# Patient Record
Sex: MALE | URBAN - METROPOLITAN AREA
[De-identification: names, ages, dates, MRNs, and addresses within clinical notes are randomized per-mention and may not be internally consistent; named-entity substitution may affect disease eponyms.]

---

## 2018-02-08 ENCOUNTER — OFFICE VISIT (OUTPATIENT)
Dept: ORTHOPEDICS | Facility: CLINIC | Age: 23
End: 2018-02-08
Payer: COMMERCIAL

## 2018-02-08 ENCOUNTER — RADIANT APPOINTMENT (OUTPATIENT)
Dept: GENERAL RADIOLOGY | Facility: CLINIC | Age: 23
End: 2018-02-08
Attending: FAMILY MEDICINE
Payer: COMMERCIAL

## 2018-02-08 VITALS
HEART RATE: 69 BPM | BODY MASS INDEX: 26.07 KG/M2 | DIASTOLIC BLOOD PRESSURE: 84 MMHG | HEIGHT: 68 IN | SYSTOLIC BLOOD PRESSURE: 138 MMHG | WEIGHT: 172 LBS

## 2018-02-08 DIAGNOSIS — M54.50 ACUTE MIDLINE LOW BACK PAIN WITHOUT SCIATICA: Primary | ICD-10-CM

## 2018-02-08 DIAGNOSIS — M54.50 ACUTE MIDLINE LOW BACK PAIN WITHOUT SCIATICA: ICD-10-CM

## 2018-02-08 DIAGNOSIS — M54.6 PAIN IN THORACIC SPINE: ICD-10-CM

## 2018-02-08 NOTE — LETTER
2/8/2018       RE: Toan Chang  [address]    [facility] [address]     Dear Colleague,    Thank you for referring your patient, Toan Chang, to the [facility] SPORTS AND ORTHOPAEDIC WALK IN CLINIC at [facility]. Please see a copy of my visit note below.         NEW PATIENT INTAKE QUESTIONNAIRE  SPORTS & ORTHOPEDIC WALK-IN 2/8/2018    Primary Care Physician:     Where are the majority of your medical records? 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  Hx of LBP. Believes has had imaging done. Stretching    Reason for Visit:    What part of your body is injured / painful?   low back    What caused the injury /pain? Fall    How long ago did your injury occur or pain begin? several days ago    What are your most bothersome symptoms? Pain    How would you characterize your symptom? sharp    What makes your symptoms better? Ice and using pillow    What makes your symptoms worse? Movement, flexion    Have you been previously seen for this problem? No    Medical History:    Medical History:     Have you had surgery on this body part before? No    Medications: None    Allergies: Yes: Asp (catepillar)    Family History of Medical Problems: Prostate cancer-Father    Previous Surgeries: Splint in right foot    Social History:    Occupation: Law Student at [employer]    Handedness: Right    Exercise: Run, Strength training    Review of Systems:    Have you recently had a a fever, chills, weight loss? No    Do you have any vision problems? No    Do you have any chest pain or edema? No    Do you have any shortness of breath or wheezing?  No    Do you have stomach problems? No    Do you have any numbness or focal weakness? No    Do you have diabetes? No    Do you have problems with bleeding or clotting? No    Do you have an rashes or other skin lesions?  No    Communication:    How did you hear about us? The  informed me about the Walk-In Clinic    Who else should know about this visit? None           CHIEF COMPLAINT:  Consult (Low back)       HISTORY OF PRESENT ILLNESS  Mr. Chang is a pleasant 22 year old year old male who presents to clinic today with pain of his mid to low back.  Toan explains that he suffered an acute injury three days ago.  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  She lost balance above him, fell backward.  Her head swung into his thoracolumbar region striking him.  Immediately painful, causing both to fall.      Since this time, he has been using ice, resting from performances, and using a pillow in lumbar region to sleep.  He has noted mild improvement with treatment modalities, but this has not improved greatly. Exacerbated with movement, flexion. Denies radicular pain into legs or buttocks.  No numbness or tingling.     Additional history: as documented    MEDICAL HISTORY  There is no problem list on file for this patient.      No current outpatient prescriptions on file.       Allergies   Allergen Reactions     Insect Extract      Asp- a [place] catepillar     Social:   Law student at  of   Right handed  Exercise: Runs, strength training    No family history on file.    Additional medical/Social/Surgical histories reviewed in [employer] and updated as appropriate.     REVIEW OF SYSTEMS (2/12/2018)  CONSTITUTIONAL: Denies fever and weight loss  EYES: Denies acute vision changes  ENT: Denies hearing changes or difficulty swallowing  CARDIAC: Denies chest pain or edema  RESPIRATORY: Denies dyspnea, cough or wheeze  GASTROINTESTINAL: Denies abdominal pain, nausea, vomiting  MUSCULOSKELETAL: See HPI  SKIN: Denies any recent rash or lesion  NEUROLOGICAL: Denies numbness or focal weakness  PSYCHIATRIC: No history of psychiatric symptoms or problems  ENDOCRINE: Denies current diagnosis of  "diabetes  HEMATOLOGY: Denies episodes of easy bleeding      PHYSICAL EXAM  /84  Pulse 69  Ht 1.715 m (5' 7.5\")  Wt 78 kg (172 lb)  BMI 26.54 kg/m2      General Appearance: Well appearing, alert, in no acute distress, well-hydrated, and well nourished  Cardiovascular: no signs of upper or lower extremity edema  Respiratory: no respiratory distress, no audible wheezing, no labored breathing, symmetric thoracic excursion  Psychiatric: mood and affect are appropriate, patient is oriented to time, place and person  General Appearance: Well appearing, alert, in no acute distress, well-hydrated, and well nourished  Skin: No rashes, lesions, or ecchymosis present  Cardiovascular: pedal pulses and radial pulses normal, no signs of upper or lower extremity edema  Respiratory: no respiratory distress, no audible wheezing, no labored breathing, symmetric thoracic excursion  Psychiatric: mood and affect are appropriate, patient is oriented to time, place and person  Extremities: no peripheral edema noted in the upper or lower extremities  Musculoskeletal: Thoracolumbar region  Inspection: Normal inspection.  No erythema, ecchymosis or gross deformity of spinous processes or low back  Palpation: Tenderness to palpation of T12, L1 spinous processes.  Additional tenderness of soft tissue in this region.    Range of Motion: Normal range of motion of lumbar spine in flexion, extension and sidebending.  Pain with deep flexion past 60 degrees.  Strength: Full 5/5 strength of lower extremities in hip flexion, extension, knee flexion, knee extension, ankle dorsiflexion, plantarflexion and eversion.  Neuro: No sensory or motor deficit, grossly normal coordination, normal muscle tone  Skin: no ecchymosis, erythema, warmth, or induration, no obvious rash  Psych: interactive, appropriate, normal mood and affect      IMAGING :XR thoracolumbar 2v Final results and radiologist's interpretation, available in the [facility] health record. " Images were reviewed with the patient/family members in the office today. My personal interpretation of the performed imaging is no acute osseous abnormality of spinous processes or pars.       ASSESSMENT & PLAN  Mr. Chang is a 22 year old year old male who presents to clinic today with acute onset of low back pain after acute trauma of another performer's head striking his back three days ago.    Diagnosis:   Contusion of low back    -Continue ice to low back  -Activity modification; rest from performing this week  -Ibuprofen  -Home exercise program  -Follow up 2 weeks if persisting    It was a pleasure seeing Toan today.    Again, thank you for allowing me to participate in the care of your patient.      Sincerely,    Patel Bowie, DO

## 2018-02-08 NOTE — MR AVS SNAPSHOT
After Visit Summary   2018    Toan Chang    MRN: [id number]           Patient Information     Date Of Birth          1995        Visit Information        Provider Department      2018 6:10 PM Patel Bowie DO M [facility] Sports and Orthopaedic Walk In Clinic        Today's Diagnoses     Acute midline low back pain without sciatica    -  1    Pain in thoracic spine           Follow-ups after your visit        Your next 10 appointments already scheduled     2018  3:20 PM CST   (Arrive by 3:05 PM)   New Patient Visit with DAVID Almanza [facility] ([facility])    [address] [place] [address]  4th [facility] [address] [phone]              Who to contact     Please call your clinic at [phone] to:    Ask questions about your health    Make or cancel appointments    Discuss your medicines    Learn about your test results    Speak to your doctor            Additional Information About Your Visit        MyChart Information     [employer] is an electronic gateway that provides easy, online access to your medical records. With iProcure, you can request a clinic appointment, read your test results, renew a prescription or communicate with your care team.     To sign up for [employer] visit the website at [email or web address]   You will be asked to enter the access code listed below, as well as some personal information. Please follow the directions to create your username and password.     Your access code is: [email or web address]  Expires: 5/10/2018  6:30 AM     Your access code will  in 90 days. If you need help or a new code, please contact your [facility] or call [phone] for assistance.        Care EveryWhere ID     This is your Care EveryWhere ID. This could be used by other organizations to access your [facility] medical records  [id number]        Your Vitals Were     Pulse  "Height BMI (Body Mass Index)             69 1.715 m (5' 7.5\") 26.54 kg/m2          Blood Pressure from Last 3 Encounters:   02/08/18 138/84    Weight from Last 3 Encounters:   02/08/18 78 kg (172 lb)               Primary Care Provider Office Phone # Fax #    DAVID Almanza [phone]       [address] [place] [address]  [facility] [address]        Equal Access to Services     DARCY GHOSH : Hadii aad ku hadasho Soomaali, waaxda luqadaha, qaybta kaalmada adeegyada, waxay idiin hayaan adeeg kharash larui . So [facility] [phone].    ATENCIÓN: Si habla español, tiene a campos disposición servicios gratuitos de asistencia lingüística. [facility] [phone].    We comply with applicable federal civil rights laws and [place] laws. We do not discriminate on the basis of race, color, national origin, age, disability, sex, sexual orientation, or gender identity.            Thank you!     Thank you for choosing [facility] SPORTS AND ORTHOPAEDIC WALK IN CLINIC  for your care. Our goal is always to provide you with excellent care. Hearing back from our patients is one way we can continue to improve our services. Please take a few minutes to complete the written survey that you may receive in the mail after your visit with us. Thank you!             Your Updated Medication List - Protect others around you: Learn how to safely use, store and throw away your medicines at www.disposemymeds.org.      Notice  As of 2/8/2018 11:59 PM    You have not been prescribed any medications.      "

## 2018-02-09 NOTE — PROGRESS NOTES
NEW PATIENT INTAKE QUESTIONNAIRE  SPORTS & ORTHOPEDIC WALK-IN 2/8/2018    Primary Care Physician:     Where are the majority of your medical records? 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  Hx of LBP. Believes has had imaging done. Stretching    Reason for Visit:    What part of your body is injured / painful?   low back    What caused the injury /pain? Fall    How long ago did your injury occur or pain begin? several days ago    What are your most bothersome symptoms? Pain    How would you characterize your symptom? sharp    What makes your symptoms better? Ice and using pillow    What makes your symptoms worse? Movement, flexion    Have you been previously seen for this problem? No    Medical History:    Medical History:     Have you had surgery on this body part before? 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 No    Do you have any vision problems? No    Do you have any chest pain or edema? No    Do you have any shortness of breath or wheezing?  No    Do you have stomach problems? No    Do you have any numbness or focal weakness? No    Do you have diabetes? No    Do you have problems with bleeding or clotting? No    Do you have an rashes or other skin lesions? No    Communication:    How did you hear about us? The  informed me about the Walk-In Clinic    Who else should know about this visit? None

## 2018-02-12 NOTE — PROGRESS NOTES
"CHIEF COMPLAINT:  Consult (Low back)       HISTORY OF PRESENT ILLNESS  Mr. Chang is a pleasant 22 year old year old male who presents to clinic today with pain of his mid to low back.  Toan explains that he suffered an acute injury three days ago.  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  She lost balance above him, fell backward.  Her head swung into his thoracolumbar region striking him.  Immediately painful, causing both to fall.      Since this time, he has been using ice, resting from performances, and using a pillow in lumbar region to sleep.  He has noted mild improvement with treatment modalities, but this has not improved greatly. Exacerbated with movement, flexion. Denies radicular pain into legs or buttocks.  No numbness or tingling.     Additional history: as documented    MEDICAL HISTORY  There is no problem list on file for this patient.      No current outpatient prescriptions on file.       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    Additional medical/Social/Surgical histories reviewed in [employer] and updated as appropriate.     REVIEW OF SYSTEMS (2/12/2018)  CONSTITUTIONAL: Denies fever and weight loss  EYES: Denies acute vision changes  ENT: Denies hearing changes or difficulty swallowing  CARDIAC: Denies chest pain or edema  RESPIRATORY: Denies dyspnea, cough or wheeze  GASTROINTESTINAL: Denies abdominal pain, nausea, vomiting  MUSCULOSKELETAL: See HPI  SKIN: Denies any recent rash or lesion  NEUROLOGICAL: Denies numbness or focal weakness  PSYCHIATRIC: No history of psychiatric symptoms or problems  ENDOCRINE: Denies current diagnosis of diabetes  HEMATOLOGY: Denies episodes of easy bleeding      PHYSICAL EXAM  /84  Pulse 69  Ht 1.715 m (5' 7.5\")  Wt 78 kg (172 lb)  BMI 26.54 kg/m2      General " Appearance: Well appearing, alert, in no acute distress, well-hydrated, and well nourished  Cardiovascular: no signs of upper or lower extremity edema  Respiratory: no respiratory distress, no audible wheezing, no labored breathing, symmetric thoracic excursion  Psychiatric: mood and affect are appropriate, patient is oriented to time, place and person  General Appearance: Well appearing, alert, in no acute distress, well-hydrated, and well nourished  Skin: No rashes, lesions, or ecchymosis present  Cardiovascular: pedal pulses and radial pulses normal, no signs of upper or lower extremity edema  Respiratory: no respiratory distress, no audible wheezing, no labored breathing, symmetric thoracic excursion  Psychiatric: mood and affect are appropriate, patient is oriented to time, place and person  Extremities: no peripheral edema noted in the upper or lower extremities  Musculoskeletal: Thoracolumbar region  Inspection: Normal inspection.  No erythema, ecchymosis or gross deformity of spinous processes or low back  Palpation: Tenderness to palpation of T12, L1 spinous processes.  Additional tenderness of soft tissue in this region.    Range of Motion: Normal range of motion of lumbar spine in flexion, extension and sidebending.  Pain with deep flexion past 60 degrees.  Strength: Full 5/5 strength of lower extremities in hip flexion, extension, knee flexion, knee extension, ankle dorsiflexion, plantarflexion and eversion.  Neuro: No sensory or motor deficit, grossly normal coordination, normal muscle tone  Skin: no ecchymosis, erythema, warmth, or induration, no obvious rash  Psych: interactive, appropriate, normal mood and affect      IMAGING :XR thoracolumbar 2v Final results and radiologist's interpretation, available in the [facility] health record. Images were reviewed with the patient/family members in the office today. My personal interpretation of the performed imaging is no acute osseous abnormality of spinous  processes or pars.       ASSESSMENT & PLAN  Mr. Chang is a 22 year old year old male who presents to clinic today with acute onset of low back pain after acute trauma of another performer's head striking his back three days ago.    Diagnosis:   Contusion of low back    -Continue ice to low back  -Activity modification; rest from performing this week  -Ibuprofen  -Home exercise program  -Follow up 2 weeks if persisting    It was a pleasure seeing Toan today.    Patel Bowie DO, CAQSM  Primary Care Sports Medicine

## 2018-02-28 ENCOUNTER — OFFICE VISIT (OUTPATIENT)
Dept: INTERNAL MEDICINE | Facility: CLINIC | Age: 23
End: 2018-02-28
Payer: COMMERCIAL

## 2018-02-28 VITALS
BODY MASS INDEX: 27.06 KG/M2 | DIASTOLIC BLOOD PRESSURE: 73 MMHG | SYSTOLIC BLOOD PRESSURE: 128 MMHG | HEART RATE: 77 BPM | WEIGHT: 168.4 LBS | HEIGHT: 66 IN

## 2018-02-28 DIAGNOSIS — Z13.220 SCREENING FOR HYPERLIPIDEMIA: Primary | ICD-10-CM

## 2018-02-28 DIAGNOSIS — L72.3 SEBACEOUS CYST: ICD-10-CM

## 2018-02-28 ASSESSMENT — PAIN SCALES - GENERAL: PAINLEVEL: MILD PAIN (2)

## 2018-02-28 NOTE — PROGRESS NOTES
Rooming Note  Health Maintenance   Health Maintenance Due   Topic Date Due     TETANUS IMMUNIZATION (SYSTEM ASSIGNED)  04/15/2012     INFLUENZA VACCINE (SYSTEM ASSIGNED)  09/01/2017    The following immunizations were discussed, but NOT ordered:   - Tetanus, Diphtheria, Pertusis (Tdap)  - Influenza (flu shot)  declined  The orders were not placed because: obtaining outside records

## 2018-02-28 NOTE — LETTER
[employer] Customer Service  [facility]  [address], [address]  [place] [address]  Fax: [phone]  Phone: [phone]      2018      Toan Chang  [address]    [place] [address]        Dear Toan,    Thank you for your interest in becoming a [employer] user!    Your access code is: [email or web address]  Expires: 5/10/2018  6:30 AM     Please access the [employer] website at [email or web address].  Below the ID and password fields, select the  Sign Up Now  as New User.  You will be prompted to enter the access code listed above as well as additional personal information.  Please follow the directions carefully when creating your username and password.    If you allow your access code to , or if you have any questions please call a [employer] Representative at [phone] during normal clinic hours.     Sincerely,      [employer] Customer Service  [facility]

## 2018-02-28 NOTE — MR AVS SNAPSHOT
After Visit Summary   2/28/2018    Toan Chang    MRN: [id number]           Patient Information     Date Of Birth          1995        Visit Information        Provider Department      2/28/2018 3:20 PM Francesca Fox, APRN CNP M [facility]        Today's Diagnoses     Screening for hyperlipidemia    -  1    Sebaceous cyst          Care Instructions    Primary Care Center: [phone]     Primary Care Center Medication Refill Request Information:  * Please contact your pharmacy regarding ANY request for medication refills.  ** [facility] Prescription Fax = [phone]  * Please allow 3 business days for routine medication refills.  * Please allow 5 business days for controlled substance medication refills.     Primary Care Center Test Result notification information:  *You will be notified with in 7-10 days of your appointment day regarding the results of your test.  If you are on MyChart you will be notified as soon as the provider has reviewed the results and signed off on them.      Derm Surgery [phone]            Follow-ups after your visit        Additional Services     DERMATOLOGY REFERRAL       Your provider has referred you to: Adult Derm-Surg  Removal of right lower facial cyst.    Please be aware that coverage of these services is subject to the terms and limitations of your health insurance plan.  Call member services at your health plan with any benefit or coverage questions.      Please bring the following with you to your appointment:    (1) Any X-Rays, CTs or MRIs which have been performed.  Contact the facility where they were done to arrange for  prior to your scheduled appointment.    (2) List of current medications  (3) This referral request   (4) Any documents/labs given to you for this referral                  Future tests that were ordered for you today     Open Future Orders        Priority Expected Expires Ordered    Lipid panel reflex to  "direct LDL Fasting Routine  2019            Who to contact     Please call your clinic at [phone] to:    Ask questions about your health    Make or cancel appointments    Discuss your medicines    Learn about your test results    Speak to your doctor            Additional Information About Your Visit        MyChart Information     [employer] is an electronic gateway that provides easy, online access to your medical records. With [employer], you can request a clinic appointment, read your test results, renew a prescription or communicate with your care team.     To sign up for [employer] visit the website at [email or web address]   You will be asked to enter the access code listed below, as well as some personal information. Please follow the directions to create your username and password.     Your access code is: [email or web address]  Expires: 5/10/2018  6:30 AM     Your access code will  in 90 days. If you need help or a new code, please contact your [facility] or call [phone] for assistance.        Care EveryWhere ID     This is your Care EveryWhere ID. This could be used by other organizations to access your [facility] medical records  [id number]        Your Vitals Were     Pulse Height BMI (Body Mass Index)             77 1.684 m (5' 6.3\") 26.94 kg/m2          Blood Pressure from Last 3 Encounters:   18 128/73   18 138/84    Weight from Last 3 Encounters:   18 76.4 kg (168 lb 6.4 oz)   18 78 kg (172 lb)              We Performed the Following     DERMATOLOGY REFERRAL        Primary Care Provider Office Phone # Fax #    Francesca DAVID Farmer [phone]       [address] [place] [address] 741  [facility] [place] [address]        Equal Access to Services     DARCY GHOSH : Alejandro Rico, willie wiseman, audrey iraheta, iris nuñez. So [facility] [phone].    ATENCIÓN: Si habla " español, tiene a campos disposición servicios gratuitos de asistencia lingüística. Esvin jenkins [phone].    We comply with applicable federal civil rights laws and [place] laws. We do not discriminate on the basis of race, color, national origin, age, disability, sex, sexual orientation, or gender identity.            Thank you!     Thank you for choosing [facility]  for your care. Our goal is always to provide you with excellent care. Hearing back from our patients is one way we can continue to improve our services. Please take a few minutes to complete the written survey that you may receive in the mail after your visit with us. Thank you!             Your Updated Medication List - Protect others around you: Learn how to safely use, store and throw away your medicines at www.disposemymeds.org.      Notice  As of 2/28/2018  4:24 PM    You have not been prescribed any medications.

## 2018-02-28 NOTE — PROGRESS NOTES
[facility]   Francesca WEEKSDAVID Barnes CNP  02/28/2018      Chief Complaint:   Establish Care (pt here to establish care) and Physical (pt here for physical)       History of Present Illness:   Toan Chang is a 22 year old male with no significant past medical history who presents for a physical exam and to establish primary care. He was seen in the walk in clinic for low back pain resulting from a dancing partner falling from above his head and striking his thoracolumbar region. He fell backwards and had to take two weeks of rest. His range of motion  He used ice, rest, and lumbar support to treat. These problems have mostly resolved but he does not have full range of motion and his baseline pain from the injury has reduced from a 7/10 to a 1/10.     He has had insomnia on and off for years. Her currently is not having issues. When he does have a bout of insomnia he has trouble falling asleep.  States everyone in his family has insomnia.    No dental care in [place], his last dental exam was 1 year ago.     Urination and bowel movements have been normal.     He has a cyst on the right side of his face. In the past there has been drainage. There was also a time where it increased in size for no known reason then reduced in size shortly after.     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    Every couple of months he has some reflux that he spits out. He denies heart burn.      He declines STD testing.    Review of Systems:   Pertinent items are noted in HPI.  All other systems are negative.  15 degree of scoliosis.    Active Medications:   No current outpatient prescriptions on file.      Allergies:   Insect extract      Past Medical History:  Wears contacts  Blood vessel burst in right eye, bed rest for a week  Scoliosis: 15 degree curvature     Past Surgical History:  Splinter removal, left foot, first with local anaesthesia, then with general anesthesia to  "remove remaining splinters  [place] teeth    Family History:   Insomnia  Father: hypertension, gall stones, possible hyperlipidemia, alcoholism, opioid addiction  Mother: Depression, alcoholism  Paternal side: multiple members with prostate cancer, required surgical removal  Paternal grandfather: Parkinson's, dementia, and alzheimer  Maternal aunt: diabetes  Paternal aunt: diabetes  Multiple family members: Brain aneurysms   Maternal grandmother: lung cancer  Maternal grandfather:  heart attack/stroke  Half sister: ovarian cysts, thyroid issue post-lang  Full sister: uterine tearing, ADHD, latex allergy  Earliest age prostate cancer was diagnosed in his family was 43.    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    Physical Exam:   /73  Pulse 77  Ht 1.684 m (5' 6.3\")  Wt 76.4 kg (168 lb 6.4 oz)  BMI 26.94 kg/m2   Wt Readings from Last 1 Encounters:   18 76.4 kg (168 lb 6.4 oz)     Constitutional: no distress, comfortable, pleasant   Eyes: anicteric, conjunctiva pink, normal extra-ocular movements .  Contact lenses.  Ears, Nose and Throat: tympanic membranes clear, nose clear and free of lesions, throat clear.   Neck: supple with full range of motion, no thyromegaly.   Cardiovascular: regular rate and rhythm, normal S1 and S2, no murmurs, rubs or gallops, peripheral pulses full and symmetric   Respiratory: clear to auscultation, no wheezes or crackles, normal breath sounds   Gastrointestinal: positive bowel sounds, nontender, no hepatosplenomegaly, no masses   Musculoskeletal: full range of motion, no edema   Gentiourinary: normal external genitalia,  Left hydrocele the size of a marble.  Penis " circumcised.  No hernias.   Skin:right cheek sebaceous cyst 5mm x 5 mm.  Neurological:, appropriate mood, good eye contact, normal affect   Lymph: no cervical,  supraclavicular, infraclavicular or inguinal nodes.      Assessment and Plan:  Toan was seen today to establish care and a physical. His medical and family histories were discussed in clinic today. His back pain from a dancing incident is resolving.     Diagnoses and all orders for this visit:    Screening for hyperlipidemia  -     Lipid panel reflex to direct LDL Fasting; Future    Sebaceous cyst  -     DERMATOLOGY REFERRAL for cyst excision     Labs will be resulted and mailed when available.        Scribe Disclosure:   I, Randolph Ram, am serving as a scribe to document services personally performed by DAVID Tan CNP at this visit, based upon the provider's statements to me. All documentation has been reviewed by the aforementioned provider prior to being entered into the official medical record.     Portions of this medical record were completed by a scribe. UPON MY REVIEW AND AUTHENTICATION BY ELECTRONIC SIGNATURE, this confirms (a) I performed the applicable clinical services, and (b) the record is accurate.   Total time spent 30  minutes.  More than 50% of the time spent with Mr. Chang on counseling / coordinating his care        Francesca HERNANDEZ CNP

## 2018-02-28 NOTE — NURSING NOTE
Chief Complaint   Patient presents with     Establish Care     pt here to establish care     Physical     pt here for physical     Mimi Garcia CMA at 3:23 PM on 2/28/2018.

## 2018-02-28 NOTE — PATIENT INSTRUCTIONS
[facility]: [phone]     [facility] Medication Refill Request Information:  * Please contact your pharmacy regarding ANY request for medication refills.  ** [facility] Prescription Fax = [phone]  * Please allow 3 business days for routine medication refills.  * Please allow 5 business days for controlled substance medication refills.     [facility] Test Result notification information:  *You will be notified with in 7-10 days of your appointment day regarding the results of your test.  If you are on MyChart you will be notified as soon as the provider has reviewed the results and signed off on them.      Derm Surgery [phone]

## 2018-03-01 ENCOUNTER — TELEPHONE (OUTPATIENT)
Dept: DERMATOLOGY | Facility: CLINIC | Age: 23
End: 2018-03-01

## 2018-03-01 NOTE — TELEPHONE ENCOUNTER
----- Message from Toby Bañuelos RN sent at 2/28/2018  4:54 PM CST -----  Regarding: FW: New Patient DX: Sebaceous Cyst, Request schedule for removal   Per Dr. Buckley Pt can be schedule for a consult with possible excision that day.     Can you please call pt and schedule.   Thanks,  Deepthi   ----- Message -----     From: Yara Stoll     Sent: 2/28/2018   4:25 PM       To: Derm Surg Triage-[facility]  Subject: New Patient DX: Sebaceous Cyst, Request sche#    Hi Ruth Miller from [facility], called to schedule patient for removal of a Sebaceous Cyst. Please follow-up with patient for scheduling.     Kind Regards    Yara     Please DO NOT send this message and/or reply back to sender. Call Center Representatives DO NOT respond to messages.

## 2018-03-01 NOTE — TELEPHONE ENCOUNTER
I called the patient and he was scheduled for an excision/ consult.   Cornelia Moreno, [facility]

## 2018-03-28 ENCOUNTER — TELEPHONE (OUTPATIENT)
Dept: DERMATOLOGY | Facility: CLINIC | Age: 23
End: 2018-03-28

## 2018-03-28 ENCOUNTER — OFFICE VISIT (OUTPATIENT)
Dept: DERMATOLOGY | Facility: CLINIC | Age: 23
End: 2018-03-28
Payer: COMMERCIAL

## 2018-03-28 DIAGNOSIS — D48.5 NEOPLASM OF UNCERTAIN BEHAVIOR OF SKIN: Primary | ICD-10-CM

## 2018-03-28 ASSESSMENT — PAIN SCALES - GENERAL
PAINLEVEL: NO PAIN (0)
PAINLEVEL: NO PAIN (0)

## 2018-03-28 NOTE — PROGRESS NOTES
[facility] Dermatology Note      Dermatology Problem List:  1. NADIA Pastor jawline     Encounter Date: Mar 28, 2018    CC:  Chief Complaint   Patient presents with     Derm Problem     Toan is here today for a cyst removal on the right jaw line.      History of Present Illness:  Mr. Toan Chang is a 22 year old male who presents today for a surgical consultation regarding a bothersome cyst on his right jaw line. This is the patient's first visit in our dermatology clinic, as well as first experience with a surgical skin procedure.     The patient states that the cyst has been present for about 3.5 years. The cyst was at its largest between July and August of this year, at which time it swelled up substantially becoming quite noticeable. The cyst also began expressing and draining a fair amount of fluid, until it felt like there was nothing left. Around mid-September, the cyst reoccurred and he decided to look into treatment options, such as surgery. He says not much hair grows around it, and either way he doesn't grow much facial hair.     The patient is otherwise feeling well. There are no other skin concerns at this time.      Past Medical History:   There is no problem list on file for this patient.    History reviewed. No pertinent past medical history.  Past Surgical History:   Procedure Laterality Date     DENTAL SURGERY       FOOT SURGERY Right     splinter removal, age 11       Social History:  The patient is currently in law school.    Family History:  Not reviewed at this visit.     Medications:  No current outpatient prescriptions on file.       Allergies   Allergen Reactions     Insect Extract      Asp- a [place] catepillar       Review of Systems:  -Skin Establ Pt: The patient denies any new rash, pruritus, or lesions that are symptomatic, changing or bleeding, except as per HPI.  -Constitutional: The patient is feeling generally well.    Physical exam:  Vitals: There were no  vitals taken for this visit.  GEN: This is a well developed, well-nourished male in no acute distress, in a pleasant mood.    SKIN: Focused examination of the face was performed.  - R lower cheek, 0.9 x 0.8 pink papule with superficial erosion  - No other lesions of concern on areas examined.       Impression/Plan:  1. Cyst - R lower cheek     Risks, benefits, and process of surgical excision were discussed including possibility of damage to surrounding anatomical structures and infection; also reviewed the healing process.     Provided option to have the procedure done at today and the patient accepted. He feels comfortable moving forward with the surgery; consent form was obtained.     See procedure note for more details regarding excision.     Provided written instructions for wound care.     Follow-up as needed for wound check.     ----------------------------------------------------------------------------------------------------------------    DERMATOLOGY EXCISION PROCEDURE NOTE    NAME OF PROCEDURE: Excision intermediate layered linear closure  Staff surgeon: Rolf Buckley DO  Scrub Nurse: Sameera Luong LPN     PRE-OPERATIVE DIAGNOSIS:  cyst  POST-OPERATIVE DIAGNOSIS: same   FINAL EXCISION SIZE(DEFECT SIZE): 0.9 x 0.8 cm, with no margin   FINAL REPAIR LENGTH: 2.1 cm     INDICATIONS: This patient presented with a 0.9 x 0.8 cm cyst of the right jawline. Excision was indicated. We discussed the principles of treatment and most likely complications including scarring, bleeding, infection, incomplete excision, wound dehiscence, pain, nerve damage, and recurrence. Informed consent was obtained and the patient underwent the procedure as follows:    PROCEDURE: The patient was taken to the operative suite. Time-out was performed. The treatment area was anesthetized with 1% lidocaine with epinephrine. The area was prepped with Chlorhexidine and rinsed with sterile saline and draped with sterile towels. The lesion was  delineated and excised in an elliptical fashion. The cyst capsule was dissected from adjacent connective tissue with scissors. The cyst cavity was irrigated with sterile normal saline. Hemostasis was obtained by electrocoagulation.     REPAIR: An intermediate layered linear closure was selected as the procedure which would maximally preserve both function and cosmesis.    After the excision of the tumor, the area was carefully undermined. Hemostasis was obtained with electrocoagulation.  Closure was oriented so that the wound was in the patient's natural skin tension lines. The subcutaneous and dermal layers were then closed with 5-0 Monocryl sutures. The epidermis was then carefully approximated along the length of the wound using 5-0 fast absorbing gut sutures.     The final wound length was 2.1 cm. A total of 6.0 ml of anesthesia was administered for all surgical sites. Estimated blood loss was less than 10 ml for all surgical sites. A sterile pressure dressing was applied and wound care instructions, with a written handout, were given. The patient was discharged from the [facility] alert and ambulatory.    Follow-up in as needed for wound check.      Anatomic Pathology Results: pending      Staff Involved:    Scribe Disclosure  I, Armand Fiore am serving as a scribe to document services personally performed by Dr. Rolf Buckley DO, based on data collection and the provider's statements to me.     Provider Disclosure:   The documentation recorded by the scribe accurately reflects the services I personally performed and the decisions made by me.  I personally performed the procedures today.    Rolf Buckley DO    Department of Dermatology  [facility]: Phone: [phone], Fax:[phone]  [facility]: Phone: [phone], Fax: [phone]

## 2018-03-28 NOTE — TELEPHONE ENCOUNTER
History of Skin cancer : no    History of Mohs Surgery: no    History of a solid organ transplant: no Organ(s):  Year(s):  Creatinine:  Bone Marrow Transplant : no (year, )    Immunosuppressive Medications: no (if yes, which ones: )    HIV or Hepatitis B or C : no    Chronic lymphocytic leukemia: no    Diabetes: no (Type 1 or 2: )    Any Bleeding disorders: no    Do you have a Pacemaker or Defibrillator: no (year placed: )    Artificial heart valve: no (mechanical or porcine: )    Joint Replacement in the last 2 years: no Joint(s):  Year(s):     Do you typically take Prophylactic Antibiotics before seeing a dentist or having a procedure?: no    If yes, verify that patient has the antibiotic on hand and instruct to take one hour before their surgery appointment.    If they do not have the antibiotic, verify the patients pharmacy and notify Dr. Wilkinson by printing the pre-mohs call sheet.    Do you wear a C Pap Mask: no    If yes, and procedure is on the face, ask patient to bring in the mask with them (Mask only)    Do you have any mobility issues: no    If yes, is a van service is required to transport you to/from your appointment? no    Smoking History (tobacco of any sort): no    Do you have any other health issues that we should know about? no    Do you take any of the following Blood Thinners:    Aspirin: no    Plavix/Aggrastat/Brilinta: no    Warfarin: no (Last INR:  Date: )    Pradaxa/Eliquis/Xarelto: no    Ibuprofen (Advil/Motrin): 3 weeks ago    Naproxen (Aleve): no    Vitamin E: no    Fish Oil: no    Ginkgo biloba: no    Other:    Done-Paient was instructed of the following: Ibuprofen, naproxen, Vitamin E, Fish Oil, and Ginkgo biloba should be stopped 1 week prior to and 1 week after the procedure.    Medication Allergies: In EHR     Are medications in Epic: in EHR    Done-Patient was reminded to take all medications as usual, other than those listed above, on the day of surgery    Done-Patient was  instructed to bring all medications to clinic on day of surgery    Done-Patient will bring a     (A  is helpful for the following reasons: some patients need medications to relax, but once given, patients should not drive; sometimes bandages obstruct your vision making it difficult to see and unsafe to drive; the day can get long so it s nice to have a fili; sometimes the procedure wears people out/makes them quite tired)    Done-Photograph of the surgical site(s) is/are available    Done-Patient was reminded that this can be an all-day procedure and that no other appointments should be scheduled on the day of Mohs

## 2018-03-28 NOTE — NURSING NOTE
injected 6.0 ml of Xylocaine  (Lidocaine 1% and Epinephrine  (1:100,000))      Present for procedure:  HANY Dominique Dr.

## 2018-03-28 NOTE — NURSING NOTE
Chief Complaint   Patient presents with     Derm Problem     Toan is here today for a cyst removal on the right jaw line.      Cornelia Moreno, CMA

## 2018-03-28 NOTE — PATIENT INSTRUCTIONS
Sutured Wound Care  Caring for your skin after surgery:    After your surgery, a pressure bandage will be placed over the area that has stitches. This will prevent bleeding. Please follow these instructions over the next 1 to 2 weeks. Following this regimen will help to prevent complications as your wound heals.      No strenuous activity for 48 hours. Resume moderate activity in 48 hours. No heavy exercising until you are seen for follow up.    Take Tylenol one hour after surgery. Take Tylenol every 4 hours as needed and do not take any aspirin products for pain (continue taking aspirin if prescribed by your doctor to prevent heart attacks and strokes).    Do not drink alcoholic beverages for 48 hours.    No dietary restrictions.    Keep the pressure bandage in place for 48 hours. If the bandage becomes blood tinged or loose, reinforce it with gauze and tape.    If the tape becomes soiled or starts to come off, reinforce it with additional paper tape.    Do not smoke for 3 weeks; smoking is detrimental to wound healing.    It is normal to have swelling and bruising around the surgical site. The bruising will fade in approximately 10-14 days. Elevate the area to reduce swelling.    Numbness, itchiness and sensitivity to temperature changes can occur after surgery and may take up to 18 months to normalize.      Bleeding:  You may see a small amount of drainage or blood on your pressure dressing. This is normal and the following instructions should be followed if the wound is bleeding saturates the dressing.    1. Leave the bandage in place.  2. Use tightly rolled up gauze or a cloth to apply direct pressure over the bandage for 20 minutes.  3. Reapply pressure for an additional 20 minutes if necessary.  4. Call the office or go to the nearest emergency room if pressure fails to stop the bleeding.  5. Use additional gauze and tape to maintain pressure once the bleeding has stopped.    Pain:  1. Post operative pain  should slowly get better, never worse.  2. A severe increase in pain may indicate a problem. Call the office if this occurs.  3. You may use ice directly over the dressing, but make sure the dressing does not get wet.    Wound care instructions for  10-14 DAYS AFTER SURGERY    1. Leave the bandage on for 10-14 days after today's bandage change.  2. In 10-14 days you may resume your regular skin care when you remove the dressing. This includes washing with mild soap and water, applying moisturizer, make-up and sunscreen.  3. If the wound is open or bleeding in any part of the incision/graft site, you should cover the area with a bandage daily as directed below:    1. Clean and dry area with plain water using a Q-tip or sterile gauze pad.  2. Apply vaseline, aquaphor, polysporin, or bacitracin ointment to the wound  3. Cover the wound with a band-aid or a sterile non-stick gauze pad and micropore paper tape.      Repeat the instructions above until wound is completely healed    If you notice that the scar is red and firm (after you remove the dressing) this is normal and will fade over time. It may take up to 6-12 months for this to occur.    Massaging the area helps the scar fade and soften quicker. Begin to massage the area one month after the dressings have been removed. Apply pressure directly and firmly over the scar with the fingertips and move in circular motions. You may massage up to 10 times daily, each time for 30 seconds.    It is normal for there to be a tender, pimple-like bump along the scar about 6-8 weeks after surgery. This sometimes happens as the scar matures and the stitches beneath begin to dissolve. Do not pick or squeeze. It will resolve in time. If an area of the wound does open and there appears to be drainage, you may apply one of the ointments listed in the above directions a few times every day until the wound is completely healed.    Numbness around the surgical site is normal. It can  take up to 12-18 months for the feeling to return to normal. Itchiness, tingling, and occasional sharp pains might be present during this portion of the healing. All of these feelings will subside once the nerves have completely healed.      Phone numbers:  During business hours (M-F 8:00-4:30 p.m.)  [facility]-  [phone] Option 1 appt. desk  [phone]  Option 3 nurse triage line  ---------------------------------------------------------  Evenings/Weekends/Holidays  Hospital - [phone]   TTY for hearing poqppxxm-[phone]  *Ask  to page dermatologist on-call  Emergency Cges-[phone]  TTY for hearing impaired- [phone]

## 2018-03-28 NOTE — MR AVS SNAPSHOT
After Visit Summary   3/28/2018    Toan Chang    MRN: [id number]           Patient Information     Date Of Birth          1995        Visit Information        Provider Department      3/28/2018 1:30 PM Rolf Buckley MD [facility] Dermatologic Surgery        Care Instructions        Sutured Wound Care  Caring for your skin after surgery:    After your surgery, a pressure bandage will be placed over the area that has stitches. This will prevent bleeding. Please follow these instructions over the next 1 to 2 weeks. Following this regimen will help to prevent complications as your wound heals.      No strenuous activity for 48 hours. Resume moderate activity in 48 hours. No heavy exercising until you are seen for follow up.    Take Tylenol one hour after surgery. Take Tylenol every 4 hours as needed and do not take any aspirin products for pain (continue taking aspirin if prescribed by your doctor to prevent heart attacks and strokes).    Do not drink alcoholic beverages for 48 hours.    No dietary restrictions.    Keep the pressure bandage in place for 48 hours. If the bandage becomes blood tinged or loose, reinforce it with gauze and tape.    If the tape becomes soiled or starts to come off, reinforce it with additional paper tape.    Do not smoke for 3 weeks; smoking is detrimental to wound healing.    It is normal to have swelling and bruising around the surgical site. The bruising will fade in approximately 10-14 days. Elevate the area to reduce swelling.    Numbness, itchiness and sensitivity to temperature changes can occur after surgery and may take up to 18 months to normalize.      Bleeding:  You may see a small amount of drainage or blood on your pressure dressing. This is normal and the following instructions should be followed if the wound is bleeding saturates the dressing.    1. Leave the bandage in place.  2. Use tightly rolled up gauze or a cloth to apply direct pressure over  the bandage for 20 minutes.  3. Reapply pressure for an additional 20 minutes if necessary.  4. Call the office or go to the nearest emergency room if pressure fails to stop the bleeding.  5. Use additional gauze and tape to maintain pressure once the bleeding has stopped.    Pain:  1. Post operative pain should slowly get better, never worse.  2. A severe increase in pain may indicate a problem. Call the office if this occurs.  3. You may use ice directly over the dressing, but make sure the dressing does not get wet.    Wound care instructions for  10-14 DAYS AFTER SURGERY    1. Leave the bandage on for 10-14 days after today's bandage change.  2. In 10-14 days you may resume your regular skin care when you remove the dressing. This includes washing with mild soap and water, applying moisturizer, make-up and sunscreen.  3. If the wound is open or bleeding in any part of the incision/graft site, you should cover the area with a bandage daily as directed below:    1. Clean and dry area with plain water using a Q-tip or sterile gauze pad.  2. Apply vaseline, aquaphor, polysporin, or bacitracin ointment to the wound  3. Cover the wound with a band-aid or a sterile non-stick gauze pad and micropore paper tape.      Repeat the instructions above until wound is completely healed    If you notice that the scar is red and firm (after you remove the dressing) this is normal and will fade over time. It may take up to 6-12 months for this to occur.    Massaging the area helps the scar fade and soften quicker. Begin to massage the area one month after the dressings have been removed. Apply pressure directly and firmly over the scar with the fingertips and move in circular motions. You may massage up to 10 times daily, each time for 30 seconds.    It is normal for there to be a tender, pimple-like bump along the scar about 6-8 weeks after surgery. This sometimes happens as the scar matures and the stitches beneath begin to  dissolve. Do not pick or squeeze. It will resolve in time. If an area of the wound does open and there appears to be drainage, you may apply one of the ointments listed in the above directions a few times every day until the wound is completely healed.    Numbness around the surgical site is normal. It can take up to 12-18 months for the feeling to return to normal. Itchiness, tingling, and occasional sharp pains might be present during this portion of the healing. All of these feelings will subside once the nerves have completely healed.      Phone numbers:  During business hours (M-F 8:00-4:30 p.m.)  [facility]-  [phone] Option 1 appt. desk  [phone]  Option 3 nurse triage line  ---------------------------------------------------------  Evenings/Weekends/Holidays  Hospital - [phone]   TTY for hearing zegxbszm-[phone]  *Ask  to page dermatologist on-call  Emergency Xyyc-[phone]  TTY for hearing impaired- [phone]                  Follow-ups after your visit        Who to contact     Please call your clinic at [phone] to:    Ask questions about your health    Make or cancel appointments    Discuss your medicines    Learn about your test results    Speak to your doctor            Additional Information About Your Visit        [employer] Information     [employer] is an electronic gateway that provides easy, online access to your medical records. With [employer], you can request a clinic appointment, read your test results, renew a prescription or communicate with your care team.     To sign up for [employer] visit the website at [email or web address]   You will be asked to enter the access code listed below, as well as some personal information. Please follow the directions to create your username and password.     Your access code is: [email or web address]  Expires: 5/10/2018  7:30 AM     Your access code will  in 90 days. If you need help or a new  code, please contact your [facility] or call [phone] for assistance.        Care EveryWhere ID     This is your Care EveryWhere ID. This could be used by other organizations to access your [facility] medical records  [id number]         Blood Pressure from Last 3 Encounters:   02/28/18 128/73   02/08/18 138/84    Weight from Last 3 Encounters:   02/28/18 76.4 kg (168 lb 6.4 oz)   02/08/18 78 kg (172 lb)              Today, you had the following     No orders found for display       Primary Care Provider Office Phone # Fax #    Francesca Fox, APRN [phone]       [address] [place] [address] [address] [place] [address]        Equal Access to Services     DARCY GHOSH : Alejandro martinso Soguillermo, waaxda luqadaha, qaybta kaalmada adeegyada, iris au . So [facility] [phone].    ATENCIÓN: Si habla español, tiene a campos disposición servicios gratuitos de asistencia lingüística. Llame al [phone].    We comply with applicable federal civil rights laws and [place] laws. We do not discriminate on the basis of race, color, national origin, age, disability, sex, sexual orientation, or gender identity.            Thank you!     Thank you for choosing [facility] DERMATOLOGIC SURGERY  for your care. Our goal is always to provide you with excellent care. Hearing back from our patients is one way we can continue to improve our services. Please take a few minutes to complete the written survey that you may receive in the mail after your visit with us. Thank you!             Your Updated Medication List - Protect others around you: Learn how to safely use, store and throw away your medicines at www.disposemymeds.org.      Notice  As of 3/28/2018  4:16 PM    You have not been prescribed any medications.

## 2018-03-28 NOTE — LETTER
3/28/2018       RE: Toan Chang  [address]    [facility] [address]     Dear Colleague,    Thank you for referring your patient, Toan Chang, to the [facility] DERMATOLOGIC SURGERY at [facility]. Please see a copy of my visit note below.    [facility] Dermatology Note      Dermatology Problem List:  1. NADIA Pastor     Encounter Date: Mar 28, 2018    CC:  Chief Complaint   Patient presents with     Derm Problem     Toan is here today for a cyst removal on the right jaw line.      History of Present Illness:  Mr. Toan Chang is a 22 year old male who presents today for a surgical consultation regarding a bothersome cyst on his right jaw line. This is the patient's first visit in our dermatology clinic, as well as first experience with a surgical skin procedure.     The patient states that the cyst has been present for about 3.5 years. The cyst was at its largest between July and August of this year, at which time it swelled up substantially becoming quite noticeable. The cyst also began expressing and draining a fair amount of fluid, until it felt like there was nothing left. Around mid-September, the cyst reoccurred and he decided to look into treatment options, such as surgery. He says not much hair grows around it, and either way he doesn't grow much facial hair.     The patient is otherwise feeling well. There are no other skin concerns at this time.      Past Medical History:   There is no problem list on file for this patient.    History reviewed. No pertinent past medical history.  Past Surgical History:   Procedure Laterality Date     DENTAL SURGERY       FOOT SURGERY Right     splinter removal, age 11       Social History:  The patient is currently in law school.    Family History:  Not reviewed at this visit.     Medications:  No current outpatient prescriptions on file.       Allergies   Allergen Reactions      Insect Extract      Asp- a [place] catepillar       Review of Systems:  -Skin Establ Pt: The patient denies any new rash, pruritus, or lesions that are symptomatic, changing or bleeding, except as per HPI.  -Constitutional: The patient is feeling generally well.    Physical exam:  Vitals: There were no vitals taken for this visit.  GEN: This is a well developed, well-nourished male in no acute distress, in a pleasant mood.    SKIN: Focused examination of the face was performed.  - R lower cheek, 0.9 x 0.8 pink papule with superficial erosion  - No other lesions of concern on areas examined.       Impression/Plan:  1. Cyst - R lower cheek     Risks, benefits, and process of surgical excision were discussed including possibility of damage to surrounding anatomical structures and infection; also reviewed the healing process.     Provided option to have the procedure done at today and the patient accepted. He feels comfortable moving forward with the surgery; consent form was obtained.     See procedure note for more details regarding excision.     Provided written instructions for wound care.     Follow-up as needed for wound check.     ----------------------------------------------------------------------------------------------------------------    DERMATOLOGY EXCISION PROCEDURE NOTE    NAME OF PROCEDURE: Excision intermediate layered linear closure  Staff surgeon: Rolf Buckley DO  Scrub Nurse: Sameera Luong LPN     PRE-OPERATIVE DIAGNOSIS:  cyst  POST-OPERATIVE DIAGNOSIS: same   FINAL EXCISION SIZE(DEFECT SIZE): 0.9 x 0.8 cm, with no margin   FINAL REPAIR LENGTH: 2.1 cm     INDICATIONS: This patient presented with a 0.9 x 0.8 cm cyst of the right jawline. Excision was indicated. We discussed the principles of treatment and most likely complications including scarring, bleeding, infection, incomplete excision, wound dehiscence, pain, nerve damage, and recurrence. Informed consent was obtained and the patient  underwent the procedure as follows:    PROCEDURE: The patient was taken to the operative suite. Time-out was performed. The treatment area was anesthetized with 1% lidocaine with epinephrine. The area was prepped with Chlorhexidine and rinsed with sterile saline and draped with sterile towels. The lesion was delineated and excised in an elliptical fashion. The cyst capsule was dissected from adjacent connective tissue with scissors. The cyst cavity was irrigated with sterile normal saline. Hemostasis was obtained by electrocoagulation.     REPAIR: An intermediate layered linear closure was selected as the procedure which would maximally preserve both function and cosmesis.    After the excision of the tumor, the area was carefully undermined. Hemostasis was obtained with electrocoagulation.  Closure was oriented so that the wound was in the patient's natural skin tension lines. The subcutaneous and dermal layers were then closed with 5-0 Monocryl sutures. The epidermis was then carefully approximated along the length of the wound using 5-0 fast absorbing gut sutures.     The final wound length was 2.1 cm. A total of 6.0 ml of anesthesia was administered for all surgical sites. Estimated blood loss was less than 10 ml for all surgical sites. A sterile pressure dressing was applied and wound care instructions, with a written handout, were given. The patient was discharged from the [facility] alert and ambulatory.    Follow-up in as needed for wound check.      Anatomic Pathology Results: pending      Staff Involved:    Scribe Disclosure  IArmand am serving as a scribe to document services personally performed by Dr. Rolf Buckley DO, based on data collection and the provider's statements to me.     Provider Disclosure:   The documentation recorded by the scribe accurately reflects the services I personally performed and the decisions made by me.  I personally performed the procedures  today.    Rolf Buckley DO    Department of Dermatology  [facility]: Phone: [phone], Fax:[phone]  [facility]: Phone: [phone], Fax: [phone]

## 2018-03-29 ENCOUNTER — TELEPHONE (OUTPATIENT)
Dept: DERMATOLOGY | Facility: CLINIC | Age: 23
End: 2018-03-29

## 2018-03-29 NOTE — TELEPHONE ENCOUNTER
Follow up call completed following Mohs procedure with Dr. Buckley.     The following questions were asked:    What are you doing to manage your pain? no  Is it helping? No pain  Are you applying ice?  no  Have you had any noticeable bleeding through the bandage? no   Do you have any concerns? no         Please call [phone] option 3 if you have any additional questions.

## 2018-04-02 LAB — COPATH REPORT: NORMAL

## 2018-06-28 NOTE — PROGRESS NOTES
Order(s) created erroneously. Erroneous order ID: [id number]   Order canceled by: BLANCA COLLAZO   Order cancel date/time: 06/28/2018 3:05 PM

## 2019-03-07 ENCOUNTER — HOSPITAL ENCOUNTER (OUTPATIENT)
Dept: MRI IMAGING | Facility: CLINIC | Age: 24
Discharge: HOME | End: 2019-03-07
Attending: FAMILY MEDICINE | Admitting: FAMILY MEDICINE
Payer: COMMERCIAL

## 2019-03-07 DIAGNOSIS — G44.82 HEADACHE ASSOCIATED WITH SEXUAL ACTIVITY: ICD-10-CM

## 2019-03-07 PROCEDURE — 25500064 ZZH RX 255 OP 636: Performed by: RADIOLOGY

## 2019-03-07 PROCEDURE — A9585 GADOBUTROL INJECTION: HCPCS | Performed by: RADIOLOGY

## 2019-03-07 PROCEDURE — 70553 MRI BRAIN STEM W/O & W/DYE: CPT

## 2019-03-07 RX ORDER — GADOBUTROL 604.72 MG/ML
10 INJECTION INTRAVENOUS ONCE
Status: COMPLETED | OUTPATIENT
Start: 2019-03-07 | End: 2019-03-07

## 2019-03-07 RX ADMIN — GADOBUTROL 8 ML: 604.72 INJECTION INTRAVENOUS at 09:36
